# Patient Record
Sex: MALE | Race: WHITE
[De-identification: names, ages, dates, MRNs, and addresses within clinical notes are randomized per-mention and may not be internally consistent; named-entity substitution may affect disease eponyms.]

---

## 2017-09-02 ENCOUNTER — HOSPITAL ENCOUNTER (EMERGENCY)
Dept: HOSPITAL 75 - ER | Age: 42
Discharge: HOME | End: 2017-09-02
Payer: SELF-PAY

## 2017-09-02 VITALS — BODY MASS INDEX: 37.77 KG/M2 | WEIGHT: 235 LBS | HEIGHT: 66 IN

## 2017-09-02 VITALS — SYSTOLIC BLOOD PRESSURE: 140 MMHG | DIASTOLIC BLOOD PRESSURE: 99 MMHG

## 2017-09-02 DIAGNOSIS — K02.9: Primary | ICD-10-CM

## 2017-09-02 PROCEDURE — 99283 EMERGENCY DEPT VISIT LOW MDM: CPT

## 2017-09-02 NOTE — ED EENT
History of Present Illness


General


Chief Complaint:  Dental Problems/Pain


Stated Complaint:  DENTAL PAIN


Nursing Triage Note:  


reports dental pain in upper left gum, pain for 4-6 hours


Source:  patient


Exam Limitations:  no limitations





History of Present Illness


Time seen by provider:  21:37


Initial Comments


The patient is a 41-year-old white male who reports that he began to experience 

rather severe pain in his left upper posterior molar about 4-6 hours prior to 

coming to the ER.  He attempted to call his dentist and got a recorded message 

which stated that at if the problem were considered serious that the patient 

should go to a local ER.


Timing/Duration:  abrupt


Location:  dental


Prearrival Treatment:  no prearrival treatment





Allergies and Home Medications


Allergies


Coded Allergies:  


     No Known Drug Allergies (Unverified , 4/1/12)





Home Medications


No Active Prescriptions or Reported Meds





Review of Systems


Constitutional:  see HPI


Eyes:  No Symptoms Reported


Ears:  No Symptoms Reported


Mouth:  other (dental pain)


Throat:  no symptoms reported


Respiratory:  no symptoms reported


Cardiovascular:  no symptoms reported


Gastrointestinal:  no symptoms reported


Musculoskeletal:  no symptoms reported


Skin:  no symptoms reported


Neurological:  No Symptoms Reported


Hematologic/Lymphatic:  No Symptoms Reported


Immunological/Allergic:  no symptoms reported





Past Medical-Social-Family Hx


Patient Social History


Alcohol Use:  Denies Use


Recreational Drug Use:  No


Smoking Status:  Never a Smoker


Recent Foreign Travel:  No


Contact w/Someone Who Travel:  No


Recent Infectious Disease Expo:  No


Recent Hopitalizations:  No





Immunizations Up To Date


Date of Influenza Vaccine:  Oct 3, 2011





Seasonal Allergies


Seasonal Allergies:  No





Surgeries


History of Surgeries:  Yes (DISLOCATED SHOULDER AND FRACTURED RIGHT HAND)


Surgeries:  Orthopedic





Respiratory


History of Respiratory Disorde:  No





Cardiovascular


History of Cardiac Disorders:  No





Neurological


History of Neurological Disord:  No


Neurological Disorders:  Concussion





Reproductive System


Hx Reproductive Disorders:  No





Genitourinary


History of Genitourinary Disor:  No





Gastrointestinal


History of Gastrointestinal Di:  No





Musculoskeletal


History of Musculoskeletal Dis:  Yes (carpal tunnel surg. bilat, knee surg)





Endocrine


History of Endocrine Disorders:  No





HEENT


History of HEENT Disorders:  No





Cancer


History of Cancer:  No





Psychosocial


History of Psychiatric Problem:  No





Integumentary


History of Skin or Integumenta:  No





Blood Transfusions


History of Blood Disorders:  No





Physical Exam


Vital Signs





Vital Sign - Last 12Hours








 9/2/17





 21:23


 


Temp 97.2


 


Pulse 77


 


Resp 20


 


B/P (MAP) 140/99


 


Pulse Ox 97


 


O2 Delivery Room Air








General Appearance:  moderate distress


Eyes:  bilateral eye normal inspection


Ears:  bilateral ear auricle normal


Nose:  normal inspection


Mouth/Throat:  dental tenderness


Neck:  full range of motion


Cardiovascular:  normal peripheral pulses, regular rate, rhythm, no edema, no 

gallop, no JVD, no murmur


Respiratory:  chest non-tender, lungs clear, normal breath sounds, no 

respiratory distress, no accessory muscle use





Progress/Results/Core Measures


Results/Orders


My Orders





Orders - WAYNE RASCON MD


Rx-Hydrocodone/Apap 5-325 Mg (Rx-Vicodin (9/2/17 22:00)





Vital Signs/I&O





Vital Sign - Last 12Hours








 9/2/17





 21:23


 


Temp 97.2


 


Pulse 77


 


Resp 20


 


B/P (MAP) 140/99


 


Pulse Ox 97


 


O2 Delivery Room Air














Blood Pressure Mean:  113











Departure


Impression


Impression:  


 Primary Impression:  


 Dental caries


Disposition:  01 HOME, SELF-CARE


Condition:  Stable/Unchanged





Departure-Patient Inst.


Decision time for Depature:  21:54


Referrals:  


NO,LOCAL PHYSICIAN (PCP)


Primary Care Physician


Patient Instructions:  Tooth Decay, Adult (DC)





Add. Discharge Instructions:  


All discharge instructions reviewed with patient and/or family. Voiced 

understanding.


Use the lidocaine jelly as demonstrated.


Take pain med if severe pain


Scripts


Hydrocodone/Acetaminophen (Norco 7.5-325 Tablet) 1 Each Tablet


1 EACH PO 4 times a day, #10 TAB


   Prov: WAYNE RASCON MD         9/2/17





Images


Mouth/Nose











1 - Caries


2 - Caries














WAYNE RASCON MD Sep 2, 2017 21:43

## 2017-09-02 NOTE — XMS REPORT
[facility]

 Created on: 08/15/2016



Keith Pink

External Reference #: [id number]

: 1975

Sex: Male



Demographics







 Address  [address] [place]  [address]

 

 Home Phone  [phone]

 

 Preferred Language  Unknown

 

 Marital Status  Unknown

 

 Mu-ism Affiliation  Unknown

 

 Race  White

 

 Ethnic Group  Not  or 





Author







 Author  PERCY VAZQUEZ

 

 Organization  [employer]

 

 Address  Unknown

 

 Phone  Unavailable







Care Team Providers







 Care Team Member Name  Role  Phone

 

 PERCY VAZQUEZ  CP  Unavailable



                                                                



Allergies, Adverse Reactions, Alerts

          





 Substance  Reaction  Event Type

 

 N.K.D.A.  Info Not Available  Non Drug Allergy



                                                                               
         



Problems

          





 Problem Type  Condition  Code  Onset Dates  Condition Status

 

 Problem  Cough  786.2     Active

 

 Problem  Acute bronchitis  466.0     Active

 

 Problem  Acute sinusitis, unspecified  461.9     Active

 

 Assessment  Laceration of face without complication, initial encounter  
S01.81XA     Active



                                                                               
                                       



Medications

          





 Medication  Code System  Code  Instructions  Start Date  End Date  Status  
Dosage

 

 Bactrim DS  NDC  [address]-87  800-160 MG Orally Twice a day  Aug 10, 2016  
Aug 20, 2016     1 tablet



                                                                               
         



Procedures

          





 Procedure  Coding System  Code  Date

 

 Office Visit, Est Pt., Level 3  CPT-4  80743  Aug 10, 2016



                                                                               
                   



Vital Signs

          





 Date/Time:  Aug 10, 2016

 

 Cardiac Monitoring Heart Rate  86 bpm

 

 Weight  238 lbs

 

 Height  66 in

 

 BMI  38.41 Index

 

 Blood Pressure Diastolic  74 mmHg

 

 Blood Pressure Systolic  128 mmHg



                                                                              



Results

          No Known Results                                                     
               



Summary Purpose

          [employer] Submission

## 2017-09-02 NOTE — XMS REPORT
Clinical Summary

 Created on: 2017



Keith Pink

External Reference #: [id number]

: 1975

Sex: Male



Demographics







 Address  [address] [place]  [address]

 

 Home Phone  [phone]

 

 Preferred Language  English

 

 Marital Status  Unknown

 

 Sabianism Affiliation  [employer]

 

 Race  White

 

 Ethnic Group  Not  or 





Author







 Author  [facility]

 

 Organization  [facility]

 

 Address  Unknown

 

 Phone  Unavailable







Support







 Name  Relationship  Address  Phone

 

 , Linda Pink  [employer]  [address] [place]  [address]  [phone]







Care Team Providers







 Care Team Member Name  Role  Phone

 

  PCP  Unavailable







Source Comments

Some departments are not documenting in the electronic medical record.  If you 
do not see the information that you expected, contact Release of Information in 
the Health Information Management department at [phone] for further 
assistance in locating additional records.[facility]



Allergies

No Known Allergies



Current Medications







      



  Prescription   Sig.   Disp.   Refills   Start   End Date   Status



      Date  

 

      



  acetaminophen (TYLENOL)   Take 2 Tabs by mouth   60 Tab   0   20    
Active



  325 mg tablet   every 4 hours as needed     14  



   for Pain.     

 

      



  diclofenac sodium DR   Take 1 Tab by mouth twice   60 Tab   2   20    
Active



  (VOLTAREN) 75 mg tablet   daily.     16  

 

      



  alendronate (FOSAMAX) 70   Take 1 Tab by mouth every   12 Tab   3   20 
   Active



  mg tablet   7 days.     16  







Active Problems







 



  Problem   Noted Date

 

 



  Acute pain of right shoulder   2016

 

 



  Humerus fracture   2014

 

 



  CMC (carpometacarpal joint) dislocation   2014

 

 



  Subluxation of distal radial-ulnar joint   2014

 

 



  Shoulder fracture   2014







Immunizations







  



  Name   Dates Previously Given   Next Due

 

  



  Flu Vaccine   10/24/2014 



  Quadrivalent=>3 Yo  



  (Preservative Free)  







Family History







   



  Medical History   Relation   Name   Comments

 

   



  Diabetes   Father  

 

   



  Hypertension   Father  

 

   



  Cancer   Mother  

 

   



  Diabetes   Mother  

 

   



  Hypertension   Mother  









   



  Relation   Name   Status   Comments

 

   



  Father    Alive 

 

   



  Mother    Alive 







Social History







    



  Tobacco Use   Types   Packs/Day   Years Used   Date

 

    



  Never Smoker    

 

    



  Smokeless Tobacco: Never   



  Used   









 Tobacco Cessation: Counseling Given: Yes











   



  Alcohol Use   Drinks/Week   oz/Week   Comments

 

   



  No   0 Standard   0.0 



   drinks or  



   equivalent  









 



  Sex Assigned at Birth   Date Recorded

 

 



  Not on file 







Last Filed Vital Signs







  



  Vital Sign   Reading   Time Taken

 

  



  Blood Pressure   146/91   2016 11:49 AM CDT

 

  



  Pulse   73   2016 11:49 AM CDT

 

  



  Temperature   36.4   C (97.5   F)   10/27/2014  3:43 PM CDT

 

  



  Respiratory Rate   -   -

 

  



  Oxygen Saturation   97%   10/27/2014  3:43 PM CDT

 

  



  Inhaled Oxygen   -   -



  Concentration  

 

  



  Weight   102.1 kg (225 lb)   2016 11:49 AM CDT

 

  



  Height   167.6 cm (5' 6")   2016 11:49 AM CDT

 

  



  Body Mass Index   36.32   2016 11:49 AM CDT







Plan of Treatment







   



  Health Maintenance   Due Date   Last Done   Comments

 

   



  PHYSICAL (COMPREHENSIVE)   10/31/1983  



  EXAM   

 

   



  PERTUSSIS VACCINE   10/31/1987  

 

   



  TETANUS VACCINE   10/31/1993  

 

   



  INFLUENZA VACCINE   2017   10/24/2014 







Results

Not on filefrom Last 3 Months

## 2017-09-02 NOTE — XMS REPORT
[facility]

 Created on: 2017



Keith Pink

External Reference #: [id number]

: 1975

Sex: Male



Demographics







 Address  [address] [place]  [address]

 

 Preferred Language  Unknown

 

 Marital Status  Unknown

 

 Episcopalian Affiliation  Unknown

 

 Race  Unknown

 

 Ethnic Group  Unknown





Author







 Author  LINDA ESCAMILLA

 

 [facility]

 

 Address  Unknown

 

 Phone  [phone]







Care Team Providers







 Care Team Member Name  Role  Phone

 

 LINDA ESCAMILLA  Unavailable  [phone]







PROBLEMS

Unknown Problems



ALLERGIES







 Substance  Reaction  Event Type  Date  Status

 

 N.K.D.A.  Unknown  Non Drug Allergy    Unknown







SOCIAL HISTORY

No smoking Hx information available



PLAN OF CARE







 Activity  Details









  









 Follow Up  prn Reason:filling #12







VITAL SIGNS







 Blood pressure systolic  133 mmHg  2016

 

 Blood pressure diastolic  82 mmHg  2016







MEDICATIONS

No Known Medications



RESULTS

No Results



PROCEDURES







 Procedure  Date Ordered  Related Diagnosis  Body Site

 

 LTD ORAL EVALUATION - PROBLEM FOCUS  2016      

 

 INTRAORL-PERIAPICAL 1 FILM 21105  2016      

 

 BITEWING - SINGLE FILM  2016      







IMMUNIZATIONS

No Known Immunizations

## 2018-02-18 ENCOUNTER — HOSPITAL ENCOUNTER (EMERGENCY)
Dept: HOSPITAL 75 - ER | Age: 43
Discharge: HOME | End: 2018-02-18
Payer: SELF-PAY

## 2018-02-18 VITALS — HEIGHT: 66 IN | BODY MASS INDEX: 37.77 KG/M2 | WEIGHT: 235 LBS

## 2018-02-18 VITALS — SYSTOLIC BLOOD PRESSURE: 154 MMHG | DIASTOLIC BLOOD PRESSURE: 98 MMHG

## 2018-02-18 DIAGNOSIS — J11.1: Primary | ICD-10-CM

## 2018-02-18 DIAGNOSIS — Z79.52: ICD-10-CM

## 2018-02-18 DIAGNOSIS — H66.92: ICD-10-CM

## 2018-02-18 PROCEDURE — 99284 EMERGENCY DEPT VISIT MOD MDM: CPT

## 2018-02-18 NOTE — XMS REPORT
Clinical Summary

 Created on: 2018



Keith Pink

External Reference #: [id number]

: 1975

Sex: Male



Demographics







 Address  [address] [place]  [address]

 

 Home Phone  [phone]

 

 Preferred Language  English

 

 Marital Status  Unknown

 

 Worship Affiliation  [employer]

 

 Race  White

 

 Ethnic Group  Not  or 





Author







 Author  [facility]

 

 Organization  [facility]

 

 Address  Unknown

 

 Phone  Unavailable







Support







 Name  Relationship  Address  Phone

 

 Linda Pink  [employer]  [address] [place]  [address]  [phone]







Care Team Providers







 Care Team Member Name  Role  Phone

 

 No Pcp, Na  PCP  Unavailable

 

 Kym Mcpherson RN  Unavailable  Unavailable

 

 Laura Guerrero RN  Unavailable  Unavailable

 

 Omar Cruz MD  Unavailable  [phone]

 

 Sherita York MD  Unavailable  [phone]

 

 Mariam Quach RN  Unavailable  Unavailable

 

 Luann Mcmahan RN  Unavailable  Unavailable

 

 Trinh Robbins MD  Unavailable  [phone]







Source Comments

Some departments are not documenting in the electronic medical record.  If you 
do not see the information that you expected, contact Release of Information in 
the Health Information Management department at [phone] for further 
assistance in locating additional records.[facility]



Allergies

No Known Allergies



Current Medications







      



  Prescription   Sig.   Disp.   Refills   Start   End Date   Status



      Date  

 

      



  acetaminophen (TYLENOL)   Take 2 Tabs by mouth   60 Tab   0   20    
Active



  325 mg tablet   every 4 hours as needed     14  



   for Pain.     

 

      



  diclofenac sodium DR   Take 1 Tab by mouth twice   60 Tab   2   20    
Active



  (VOLTAREN) 75 mg tablet   daily.     16  

 

      



  alendronate (FOSAMAX) 70   Take 1 Tab by mouth every   12 Tab   3   20 
   Active



  mg tablet   7 days.     16  







Active Problems







 



  Problem   Noted Date

 

 



  Acute pain of right shoulder   2016

 

 



  Humerus fracture   2014

 

 



  CMC (carpometacarpal joint) dislocation   2014

 

 



  Subluxation of distal radial-ulnar joint   2014

 

 



  Shoulder fracture   2014







Immunizations







  



  Name   Dates Previously Given   Next Due

 

  



  Flu Vaccine   10/24/2014 



  Quadrivalent=>3 Yo  



  (Preservative Free)  







Family History







   



  Medical History   Relation   Name   Comments

 

   



  Diabetes   Father  

 

   



  Hypertension   Father  

 

   



  Cancer   Mother  

 

   



  Diabetes   Mother  

 

   



  Hypertension   Mother  









   



  Relation   Name   Status   Comments

 

   



  Father    Alive 

 

   



  Mother    Alive 







Social History







    



  Tobacco Use   Types   Packs/Day   Years Used   Date

 

    



  Never Smoker    

 

    



  Smokeless Tobacco: Never   



  Used   









 Tobacco Cessation: Counseling Given: Yes











   



  Alcohol Use   Drinks/Week   oz/Week   Comments

 

   



  No   0 Standard   0.0 



   drinks or  



   equivalent  









 



  Sex Assigned at Birth   Date Recorded

 

 



  Not on file 







Last Filed Vital Signs







  



  Vital Sign   Reading   Time Taken

 

  



  Blood Pressure   146/91   2016 11:49 AM CDT

 

  



  Pulse   73   2016 11:49 AM CDT

 

  



  Temperature   36.4   C (97.5   F)   10/27/2014  3:43 PM CDT

 

  



  Respiratory Rate   -   -

 

  



  Oxygen Saturation   97%   10/27/2014  3:43 PM CDT

 

  



  Inhaled Oxygen   -   -



  Concentration  

 

  



  Weight   102.1 kg (225 lb)   2016 11:49 AM CDT

 

  



  Height   167.6 cm (5' 6")   2016 11:49 AM CDT

 

  



  Body Mass Index   36.32   2016 11:49 AM CDT







Plan of Treatment







   



  Health Maintenance   Due Date   Last Done   Comments

 

   



  PHYSICAL (COMPREHENSIVE)   10/31/1983  



  EXAM   

 

   



  PERTUSSIS VACCINE   10/31/1987  

 

   



  TETANUS VACCINE   10/31/1993  

 

   



  INFLUENZA VACCINE   2017   10/24/2014 







Results

Not on filefrom Last 3 Months

## 2018-02-18 NOTE — XMS REPORT
[facility]

 Created on: 2017



Keith Pink

External Reference #: [id number]

: 1975

Sex: Male



Demographics







 Address  [address] [place]  [address]

 

 Preferred Language  Unknown

 

 Marital Status  Unknown

 

 Episcopal Affiliation  Unknown

 

 Race  Unknown

 

 Ethnic Group  Unknown





Author







 Author  PERCY VAZQUEZ

 

 [facility]

 

 Address  [address] [place]  [address]



 

 Phone  [phone]







Care Team Providers







 Care Team Member Name  Role  Phone

 

 PERCY VAZQUEZ  Unavailable  [phone]







PROBLEMS

Unknown Problems



ALLERGIES

No Known Allergies



SOCIAL HISTORY

Never Assessed



PLAN OF CARE





VITAL SIGNS







 Height  66 in  2017-02-10

 

 Weight  250 lbs  2017-02-10

 

 Temperature  97.6 degrees Fahrenheit  2017-02-10

 

 Heart Rate  76 bpm  2017-02-10

 

 Respiratory Rate  20   2017-02-10

 

 BMI  40.35 kg/m2  2017-02-10

 

 Blood pressure systolic  128 mmHg  2017-02-10

 

 Blood pressure diastolic  70 mmHg  2017-02-10







MEDICATIONS







 Medication  Instructions  Dosage  Frequency  Start Date  End Date  Duration  
Status

 

 PredniSONE 20 MG  Orally Once a day  2 tablets  24h  10 Feb, 2017  15 Feb, 
2017  5 days  Active







RESULTS

No Results



PROCEDURES

No Known procedures



IMMUNIZATIONS

No Known Immunizations



MEDICAL (GENERAL) HISTORY







 Type  Description  Date

 

 Medical History  surgery requiring rods, pins or screws   

 

 Medical History  head injury- concussions   

 

 Surgical History  multiple orthopedic surgery

## 2018-02-18 NOTE — XMS REPORT
Continuity of Care Document

 Created on: 2018



DEBRA MCCALL

External Reference #: [id number]

: 1975

Sex: Male



Demographics







 Address  [address] [place] [address]

 

 Home Phone  [phone] x

 

 Preferred Language  Unknown

 

 Marital Status  Unknown

 

 Druze Affiliation  Unknown

 

 Race  Unknown

 

 Ethnic Group  Unknown





Author







 Author  [facility]

 

 Address  Unknown

 

 Phone  Unavailable



              



Allergies

      





 Active            Description            Code            Type            
Severity            Reaction            Onset            Reported/Identified   
         Relationship to Patient            Clinical Status        

 

 Yes            No Known Drug Allergies            [id number]            Drug 
Allergy            Unknown            N/A                         2012   
                               



                  



Medications

      



There is no data.                  



Problems

      





 Date Dx Coded            Attending            Type            Code            
Diagnosis            Diagnosed By        

 

 2012            LIMA ROBERTSON MD                         466.0   
         BRONCHITIS, ACUTE                     

 

 2012                         Ot            592.1                        
          

 

 2012                         Ot            789.09                       
           

 

 2012                         Ot            276.51                       
           

 

 2012                         Ot            276.9                        
          

 

 2012                         Ot            790.5                        
          

 

 2012                         Ot            846.0                        
          

 

 2012                         Ot            847.0                        
          

 

 2012                         Ot            850.5                        
          

 

 2012                         Ot            922.1                        
          

 

 2012                         Ot            E000.8                       
           

 

 2012                         Ot            E016.2                       
           

 

 2012                         Ot            E849.0                       
           

 

 2012                         Ot            E882                         
         

 

 2012                         Ot            V06.1                        
          

 

 2014            LIMA ROBERTSON MD                         461.9   
         ACUTE SINUSITIS UNSPECIFIED                     

 

 2014            LIMA ROBERTSON MD                         786.2   
         COUGH                     

 

 2014            JUNAID LUJAN MD            Ot            812.00       
                           

 

 2014            JUNAID LUJAN MD            Ot            833.01       
                           

 

 2014            JUNAID LUJAN MD            Ot            833.04       
                           

 

 2014            JUNAID LUJAN MD            Ot            E000.8       
                           

 

 2014            JUNAID LUJAN MD            Ot            E881.0       
                           

 

 2014            VINNIE MCBRIDE, ELIZABETH ZEPEDA            Ot            998.89   
                               

 

 2014            ELIZABETH BIRMINGHAM MD            Ot            998.89   
                               

 

 2014            VINNIE MCBRIDE, ELIZABETH ZEPEDA            Ot            833.01   
                               

 

 2014            VINNIE MCBRIDE, ELIZABETH ZEPEDA            Ot            833.04   
                               

 

 2014            HEDDINGS MD, ELIZABETH A            Ot            V57.21   
                               

 

 2014            VINNIE MCBRIDE, ELIZABETH A            Ot            998.89   
                               

 

 2014            VINNIE MCBRIDE, ELIZABETH A            Ot            833.01   
                               

 

 2014            VINNIE MCBRIDE, ELIZABETH A            Ot            833.04   
                               

 

 2014            VNINIE MCBRIDE, ELIZABETH A            Ot            V57.21   
                               

 

 2014            VINNIE MCBRIDE, ELIZABETH A            Ot            833.01   
                               

 

 2014            VINNIE MCBRIDE, ELIZABETH A            Ot            833.04   
                               

 

 2014            VINNIE MCBRIDE, ELIZABETH A            Ot            V57.21   
                               

 

 2014            VINNIE MCBRIDE, ELIZABETH A            Ot            833.01   
                               

 

 2014            VINNIE MCBRIDE, ELIZABETH A            Ot            833.04   
                               

 

 2014            VINNIE MCBRIDE, ELIZABETH A            Ot            V57.21   
                               

 

 2014            VINNIE MCBRIDE, ELIZABETH A            Ot            833.01   
                               

 

 2014            VINNIE MCBRIDE, ELIZABETH A            Ot            833.04   
                               

 

 2014            VINNIE MCBRIDE, ELIZABETH A            Ot            V57.21   
                               

 

 2014            VINNIE MCBRIDE, ELIZABETH A            Ot            833.01   
                               

 

 2014            VINNIE MCBRIDE, ELIZABETH A            Ot            833.04   
                               

 

 2014            VINNIE MCBRIDE, ELIZABETH A            Ot            V57.21   
                               

 

 2014            VINNIE MCBRIDE, ELIZABETH A            Ot            833.01   
                               

 

 2014            VINNIE MCBRIDE, ELIZABETH A            Ot            833.04   
                               

 

 2014            VINNIE MCBRIDE, ELIZABETH A            Ot            V57.21   
                               

 

 2014            VINNIE MCBRIDE, ELIZABETH A            Ot            833.01   
                               

 

 2014            VINNIE MCBRIDE, ELIZABETH A            Ot            833.04   
                               

 

 2014            VINNIE MCBRIDE, ELIZABETH A            Ot            V57.21   
                               

 

 12/15/2014            VINNIE MCBRIDE, ELIZABETH A            Ot            833.01   
                               

 

 12/15/2014            VINNIE MCBRIDE, ELIZABETH A            Ot            833.04   
                               

 

 12/15/2014            VINNIE MCBRIDE, ELIZABETH A            Ot            V57.21   
                               

 

 12/15/2014            VINNIE MCBRIDE, ELIZABETH A            Ot            833.01   
                               

 

 12/15/2014            VINNIE MCBRIDE, ELIZABETH A            Ot            833.04   
                               

 

 12/15/2014            VINNIE MCBRIDE, ELIZABETH A            Ot            V57.21   
                               

 

 2014            VINNIE MCBRIDE, ELIZABETH A            Ot            998.89   
                               

 

 2014            VINNIE MCBRIDE, ELIZABETH A            Ot            833.01   
                               

 

 2014            VINNIE MCBRIDE, ELIZABETH A            Ot            833.04   
                               

 

 2014            VINNIE MCBRIDE, ELIZABETH A            Ot            V57.21   
                               

 

 2015            VINNIE MCBRIDE, ELIZABETH A            Ot            833.01   
                               

 

 2015            VINNIE MCBRIDE, ELIZABETH A            Ot            833.04   
                               

 

 2015            VINNIE MCBRIDE, ELIZABETH A            Ot            V57.21   
                               

 

 2015            VINNIE MCBRIDE, ELIZABETH A            Ot            833.01   
                               

 

 2015            VINNIE MCBRIDE, ELIZABETH A            Ot            833.04   
                               

 

 2015            VINNIE MCBRIDE, ELIZABETH A            Ot            V57.21   
                               

 

 2015            VINNIE MCBRIDE, ELIZABETH A            Ot            998.89   
                               

 

 2015            VINNIE MCBRIDE, ELIZABETH A            Ot            833.01   
                               

 

 2015            VINNIE MCBRIDE, ELIZABETH A            Ot            833.04   
                               

 

 2015            VINNIE MCBRIDE, ELIZABETH A            Ot            V57.21   
                               

 

 2015            VINNIE MCBRIDE, ELIZABETH A            Ot            998.89   
                               

 

 2015            VINNIE MCBRIDE, ELIZABETH A            Ot            833.01   
                               

 

 2015            VINNIE MCBRIDE, ELIZABETH A            Ot            833.04   
                               

 

 2015            VINNIE MCBRIDE, ELIZABETH A            Ot            V57.21   
                               

 

 2015            VINNIE MCBRIDE, ELIZABETH A            Ot            833.01   
                               

 

 2015            VINNIE MCBRIDE, ELIZABETH A            Ot            833.04   
                               

 

 2015            VINNIE MCBRIDE, ELIZABETH A            Ot            V57.21   
                               

 

 2015            VINNIE MCBRIDE, ELIZABETH A            Ot            833.01   
                               

 

 2015            VINNIE MCBRIDE, ELIZABETH A            Ot            833.04   
                               

 

 2015            VINNIE MCBRIDE, ELIZABETH A            Ot            V57.21   
                               

 

 2015            VINNIE MCBRIDE, ELIZABETH A            Ot            833.01   
                               

 

 2015            VINNIE MCBRIDE, ELIZABETH A            Ot            833.04   
                               

 

 2015            VINNIE MCBRIDE, ELIZABETH A            Ot            V57.21   
                               

 

 2015            VINNIE MCBRIDE, ELIZABETH ZEPEDA            Ot            833.01   
                               

 

 2015            VINNIE MCBRIDE, ELIZABETH ZEPEDA            Ot            833.04   
                               

 

 2015            VINNIE MCBRIDE, ELIZABETH ZEPEDA            Ot            V57.21   
                               

 

 2015            VINNIE MCBRIDE, ELIZABETH ZEPEDA            Ot            V57.21   
                               

 

 2015            VINNIE MCBRIDE, ELIZABETH ZEPEDA            Ot            V57.21   
                               

 

 2015            VINNIE MCBRIDE, ELIZABETH ZEPEDA            Ot            V57.21   
                               

 

 2015            VINNIE MCBRIDE, ELIZABETH ZEPEDA            Ot            V57.21   
                               

 

 2015            VINNIE MCBRIDE, ELIZABETH ZEPEDA            Ot            V57.21   
                               

 

 2015            VINNIE MCBRIDE, ELIZABETH ZEPEDA            Ot            V57.21   
                               

 

 2015            VINNIE MCBRIDE, ELIZABETH ZEPEDA            Ot            V57.21   
                               

 

 2015            VINNIE MCBRIDE, ELIZABETH ZEPEDA            Ot            V58.43   
                               

 

 2015            NICHOLAS VALENTINE APRN            Ot            845.00     
       SPRAIN OF ANKLE NOS                     

 

 2015            NICHOLAS VALENTINE APRN            Ot            959.7      
      LOWER LEG INJURY NOS                     

 

 2015            NICHOLAS VALENTINE APRN            Ot            E000.8     
       OTHER EXTERNAL CAUSE STATUS                     

 

 2015            NICHOLAS VALENTINE APRN            Ot            E928.9     
       ACCIDENT NOS                     

 

 2017            WAYNE RASCON MD            Ot            K02.9      
      DENTAL CARIES, UNSPECIFIED                     

 

 2017            WAYNE RASCON MD            Ot            K08.89     
       OTHER SPECIFIED DISORDERS OF TEETH AND S                     



                                                                               
                                                                               
                                                        



Procedures

      



There is no data.                  



Results

      



There is no data.              



Encounters

      





 ACCT No.            Visit Date/Time            Discharge            Status    
        Pt. Type            Provider            Facility            Loc./Unit  
          Complaint        

 

 [id number]            2014 08:46:00            2014 23:59:59          
  CLS            Outpatient            LIMA ROBERTSON MD                 
                              

 

 [id number]            2017 21:04:00            2017 22:03:00    
        DIS            Outpatient            WAYNE RASCON MD            [facility] 
[facility]            ER            DENTAL PAIN        

 

 [id number]            2015 14:11:00            2015 16:00:00    
        DIS            Emergency            NICHOLAS VALENTINE            [facility] 
[facility]            ER            LEFT LEG PAIN        

 

 [id number]            2015 09:04:00            2015 23:59:59    
        CLS            Outpatient            ELIZABETH BIRMINGHAM MD            
[facility]            REHAB                     

 

 [id number]            2015 09:01:00            2015 00:01:00    
        DIS            Outpatient            ELIZABETH BIRMINGHAM MD            
[facility]            REHAB                     

 

 [id number]            2015 00:36:00            2015 23:59:59    
        CLS            Preadmit            ELIZABETH BIRMINGHAM MD            [facility] 
[facility]                     

 

 [id number]            2014 13:47:00            2015 00:01:00    
        DIS            Outpatient            ELIZABETH BIRMINGHAM MD            
[facility]                     

 

 [id number]            2014 14:26:00            2014 18:54:00    
        DIS            Inpatient            JUNAID LUJAN MD            [facility] 
[facility]            SURGICAL                     

 

 [id number]            2012 01:30:00                                   
   Document Registration                                                       
     

 

 [id number]            2012 14:23:00                                   
   Document Registration

## 2018-02-18 NOTE — ED COUGH/URI
General


Chief Complaint:  Cough/Cold/Flu Symptoms


Stated Complaint:  FLU SYM PAIN FROM EAR TO NECK


Nursing Triage Note:  


Patient reports having a cough for over a week and being evaluated diagnosed 


with influenza but was told that he was out of the window for tamiflu and was 


given tessalon pearls, patient reports that tonight he developed pain in his L 


ear and L neck


Source:  patient





History of Present Illness


Date Seen by Provider:  Feb 18, 2018


Time Seen by Provider:  00:20


Initial Comments


PT STATES HE HAS BEEN SICK SINCE LAST FRIDAY 02/09/18


HAS HAD HARSH NON-PRODUCTIVE COUGH


SHORTNESS OF BREATH WITH COUGHING ONLY


HAS HAD SUBJECTIVE FEVER AND CHILLS


C/O HEADACHE AND BODY ACHES


SEEN AT [facility] ON TUESDAY 02/13/18 FOR THIS PROBLEM AND STATES HE TESTED + FOR 

INFLUENZA ( DOES NOT REMEMBER WHAT TYPE )--STATES HE WAS OUTSIDE OF WINDOW FOR 

TREATMENT WITH TAMIFLU, BUT WAS GIVEN RX FOR TESSALON, WHICH IS NOT HELPING 

WITH COUGH


TONIGHT HE BEGAN HAVING SEVERE PAIN IN LEFT EAR, RADIATING DOWN LEFT SIDE OF 

NECK


HAS NOT TAKEN ANYTHING FOR SYMPTOMS EXCEPT THE TAMIFLU





PCP: [facility]





Allergies and Home Medications


Allergies


Coded Allergies:  


     No Known Drug Allergies (Unverified , 4/1/12)





Home Medications


Cefdinir 300 Mg Capsule, 300 MG PO BID, #20


   Prescribed by: JOHANN SILVA on 2/18/18 0119


Hydrocodone/Acetaminophen 1 Each Tablet, 1 EACH PO 4 times a day, #10


   Prescribed by: WAYNE RASCON on 9/2/17 2153


Methylprednisolone 4 Mg Tab.ds.pk, 4 MG PO UD, #1


   Prescribed by: JOHANN SILVA on 2/18/18 0119


Promethazine HCl/Codeine 118 Ml Syrup, 5 ML PO Q4H, #120


   Prescribed by: JOHANN SILVA on 2/18/18 0119





Constitutional:  no symptoms reported


EENTM:  see HPI, ear pain, nose congestion


Respiratory:  see HPI, cough, short of breath, No wheezing


Cardiovascular:  no symptoms reported


Gastrointestinal:  no symptoms reported


Genitourinary:  no symptoms reported


Musculoskeletal:  see HPI (BODY ACHES)


Skin:  no symptoms reported


Psychiatric/Neurological:  See HPI, Headache


Hematologic/Lymphatic:  No Symptoms Reported


Immunological/Allergic:  no symptoms reported





Past Medical-Social-Family Hx


Patient Social History


Alcohol Use:  Denies Use


Recreational Drug Use:  No


Smoking Status:  Never a Smoker


Recent Foreign Travel:  No


Contact w/Someone Who Travel:  No


Recent Infectious Disease Expo:  No


Recent Hopitalizations:  No


Physical Abuse:  No


Sexual Abuse:  No





Immunizations Up To Date


Date of Influenza Vaccine:  Oct 3, 2011





Seasonal Allergies


Seasonal Allergies:  No





Surgeries


History of Surgeries:  Yes (FX AND DISLOCATED RIGHT SHOULDER WITH ORIF; LEFT 

WRIST FX/ORIF WITH HARDWARE REMOVAL; BILATERAL KNEE SCOPES, BILATERAL CARPAL 

TUNNEL)


Surgeries:  Orthopedic





Respiratory


History of Respiratory Disorde:  No





Cardiovascular


History of Cardiac Disorders:  No





Neurological


History of Neurological Disord:  Yes


Neurological Disorders:  Concussion





Reproductive System


Hx Reproductive Disorders:  No





Genitourinary


History of Genitourinary Disor:  No





Gastrointestinal


History of Gastrointestinal Di:  No





Musculoskeletal


History of Musculoskeletal Dis:  Yes (carpal tunnel surg. bilat, knee surg)


Musculoskeletal Disorders:  Fractures





Endocrine


History of Endocrine Disorders:  No





HEENT


History of HEENT Disorders:  No





Cancer


History of Cancer:  No





Psychosocial


History of Psychiatric Problem:  No


Suicide Risk Score:  0





Integumentary


History of Skin or Integumenta:  No





Blood Transfusions


History of Blood Disorders:  No





Physical Exam


Vital Signs





Vital Signs - First Documented








 2/18/18





 00:33


 


Temp 98.0


 


Pulse 84


 


Resp 18


 


B/P (MAP) 167/105 (125)


 


Pulse Ox 98





Capillary Refill : Less Than 3 Seconds


General Appearance:  WD/WN, no apparent distress, other (FREQUENT HARSH COUGH)


HEENT:  PERRL/EOMI, TM abnormal (L) (LEFT TM INFLAMED, WITH QUESTIONABLE 

EFFUSION), other (MILD NASAL MUCOSAL EDEMA AND CLEAR RHINORRHEA)


Neck:  full range of motion, supple, lymphadenopathy (L)


Respiratory:  normal breath sounds, no respiratory distress, no accessory 

muscle use


Cardiovascular:  regular rate, rhythm, no murmur


Extremities:  normal inspection


Neurologic/Psychiatric:  CNs II-XII nml as tested, no motor/sensory deficits, 

alert, normal mood/affect, oriented x 3


Skin:  normal color, warm/dry, No rash





Progress/Results/Core Measures


Suspected Sepsis


Recent Fever Within 48 Hours:  No


Infection Criteria Present:  None


New/Unexplained  Altered Menta:  No


Sepsis Screen:  No Definite Risk


Sepsis Diagnosis:  


SIRS


Temperature:98.0 


Pulse: 84 


Respiratory Rate: 18


 


Blood Pressure 167 /105 


Mean: 125





Results/Orders


My Orders





Orders - JOHANN SILVA DO


Ceftriaxone Injection (Rocephin Injectio (2/18/18 00:45)


Methylprednisolone Sod Succ (Solu-Medrol (2/18/18 00:43)


Lidocaine 1% Injection (Xylocaine 1% Inj (2/18/18 00:45)


Promethazine/ Codeine Syrup (Phenergan W (2/18/18 00:45)


Lidocaine 1% (Xylocaine 1%) (2/18/18 00:51)





Medications Given in ED





Current Medications








 Medications  Dose


 Ordered  Sig/Wendy


 Route  Start Time


 Stop Time Status Last Admin


Dose Admin


 


 Ceftriaxone Sodium  1,000 mg  ONCE  ONCE


 IM  2/18/18 00:45


 2/18/18 00:46 DC 2/18/18 01:16


1,000 MG


 


 Lidocaine HCl  50 ml  STK-MED ONCE


 .ROUTE  2/18/18 00:51


 2/18/18 00:54 DC 2/18/18 01:10


2.1 ML


 


 Promethazine HCl/


 Codeine  5 ml  ONCE  ONCE


 PO  2/18/18 00:45


 2/18/18 00:46 DC 2/18/18 01:10


5 ML








Vital Signs/I&O





Vital Sign - Last 12Hours








 2/18/18





 00:33


 


Temp 98.0


 


Pulse 84


 


Resp 18


 


B/P (MAP) 167/105 (125)


 


Pulse Ox 98





Capillary Refill : Less Than 3 Seconds








Blood Pressure Mean:  125











Departure


Impression


Impression:  


 Primary Impression:  


 Influenza


 Additional Impression:  


 Left otitis media


Disposition:  01 HOME, SELF-CARE


Condition:  Stable





Departure-Patient Inst.


Referrals:  


[facility] (PCP)


Primary Care Physician








NO,LOCAL PHYSICIAN (Family)


Primary Care Physician


Patient Instructions:  Ear Infections (Otitis Media) (DC), Flu, Adult (DC)





Add. Discharge Instructions:  


TAKE TESSALON AS NEEDED FOR COUGH





MUCINEX DM FOR COUGH





TYLENOL 1 GRAM AS NEEDED FOR PAIN OR FEVER





LOTS OF CLEAR LIQUIDS





FOLLOW UP WITH YOUR DR IN 3-4 DAYS IF NO BETTER





All discharge instructions reviewed with patient and/or family. Voiced 

understanding.


Scripts


Promethazine HCl/Codeine (Promethazine-Codeine Syrup) 118 Ml Syrup


5 ML PO Q4H for Cough, #120 ML


   Prov: RICARDOEDA K DO         2/18/18 


Methylprednisolone (Medrol) 4 Mg Tab.ds.pk


4 MG PO UD, #1 PKG


   Prov: RICARDO,JOHANN K DO         2/18/18 


Cefdinir (Cefdinir) 300 Mg Capsule


300 MG PO BID for FOR INFECTION, #20 CAP


   Prov: JOHANN SILVA DO         2/18/18











JOHANN SILVA DO Feb 18, 2018 00:51